# Patient Record
Sex: MALE | Race: WHITE | Employment: OTHER | URBAN - METROPOLITAN AREA
[De-identification: names, ages, dates, MRNs, and addresses within clinical notes are randomized per-mention and may not be internally consistent; named-entity substitution may affect disease eponyms.]

---
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
## 2022-01-07 ENCOUNTER — PREP FOR PROCEDURE (OUTPATIENT)
Dept: DENTISTRY | Age: 22
End: 2022-01-07

## 2022-01-07 RX ORDER — SODIUM CHLORIDE 9 MG/ML
25 INJECTION, SOLUTION INTRAVENOUS PRN
Status: CANCELLED | OUTPATIENT
Start: 2022-01-07

## 2022-01-07 RX ORDER — SODIUM CHLORIDE 0.9 % (FLUSH) 0.9 %
10 SYRINGE (ML) INJECTION EVERY 12 HOURS SCHEDULED
Status: CANCELLED | OUTPATIENT
Start: 2022-01-07

## 2022-01-07 RX ORDER — SODIUM CHLORIDE 0.9 % (FLUSH) 0.9 %
10 SYRINGE (ML) INJECTION PRN
Status: CANCELLED | OUTPATIENT
Start: 2022-01-07

## 2022-01-07 RX ORDER — SODIUM CHLORIDE, SODIUM LACTATE, POTASSIUM CHLORIDE, CALCIUM CHLORIDE 600; 310; 30; 20 MG/100ML; MG/100ML; MG/100ML; MG/100ML
INJECTION, SOLUTION INTRAVENOUS CONTINUOUS
Status: CANCELLED | OUTPATIENT
Start: 2022-01-07

## 2022-02-10 NOTE — PROGRESS NOTES
DR. 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 HIS LAST CHECKUP WAS IN 2019. EKG AND ECHO WERE NORMAL.  RECORDS ARE AVAILABLE IN CARE EVERYWHERE

## 2022-02-10 NOTE — PROGRESS NOTES
[facility] PRE-ADMISSION TESTING GENERAL INSTRUCTIONS- [facility]-phone number:[phone]    GENERAL INSTRUCTIONS  [x] Antibacterial Soap shower Night before and/or AM of Surgery  [] Tray wipe instruction sheet and wipes given. [x] Nothing by mouth after midnight, including gum, candy, mints, or water.  [] You may brush your teeth, gargle, but do NOT swallow water. []Hibiclens shower  the night before and the morning of surgery. Do not use             Hibiclens on your face or head. [x]No smoking, chewing tobacco, illegal drugs, or alcohol within 24 hours of your surgery. [x] Jewelry, valuables or body piercing's should not be brought to the hospital. All body and/or tongue piercing's must be removed prior to arriving to hospital.  ALL hair pins must be removed. [x] Do not wear makeup, lotions, powders, deodorant. Nail polish as directed by the nurse. [x] Arrange transportation with a responsible adult  to and from the hospital. If you do not have a responsible adult  to transport you, you will need to make arrangements with a medical transportation company (i.e. [employer]. A Uber/taxi/bus is not appropriate unless you are accompanied by a responsible adult ). Arrange for someone to be with you for the remainder of the day and for 24 hours after your procedure due to having had anesthesia. Who will be your  for transportation?____TERESA______________   Who will be staying with you for 24 hrs after yourTERESA procedure?__________________  [x] Bring insurance card and photo ID.  [] Transfusion Bracelet: Please bring with you to hospital, day of surgery  [] Bring urine specimen day of surgery. Any small container is acceptable. [] Use inhalers the morning of surgery and bring with you to hospital.  [x] Bring copy of living will or healthcare power of  papers to be placed in your electronic record.   [] CPAP/BI-PAP: Please bring your machine if you are to spend the night in the hospital.     PARKING INSTRUCTIONS:   [x] Arrival Time:____0900_________  · [x] Parking lot '\"I\"  is located on [facility] (the corner of [facility] and [facility]). To enter, press the button and the gate will lift. A free token will be provided to exit the lot. One car per patient is allowed to park in this lot. All other cars are to park on [address] [place] [address] either in the parking garage or the handicap lot. [] To reach the [facility] lobby from [address] [place] [address], upon entering the hospital, take elevator B to the 3rd floor. EDUCATION INSTRUCTIONS:      [] Knee or hip replacement booklet & exercise pamphlets given. [] [address] placed in chart. [] Pre-admission Testing educational folder given  [] Incentive Spirometry,coughing & deep breathing exercises reviewed. []Medication information sheet(s)   []Fluoroscopy-Xray used in surgery reviewed with patient. Educational pamphlet placed in chart. [x]Pain: Post-op pain is normal and to be expected. You will be asked to rate your pain from 0-10(a zero is not acceptable-education is needed). Your post-op pain goal is:3[] Ask your nurse for your pain medication. [] Joint camp offered. [] Joint replacement booklets given. [] Other:___________________________    MEDICATION INSTRUCTIONS:   [x]Bring a complete list of your medications, please write the last time you took the medicine, give this list to the nurse.   [] Take the following medications the morning of surgery with 1-2 ounces of water:   [x] Stop herbal supplements and vitamins 5 days before your surgery. LD 2/10/22  [] DO NOT take any diabetic medicine the morning of surgery. Follow instructions for insulin the day before surgery. [] If you are diabetic and your blood sugar is low or you feel symptomatic, you may drink 1-2 ounces of apple juice or take a glucose tablet.   The morning of your procedure, you may call the pre-op area if you have concerns about your blood sugar [phone]. [] Use your inhalers the morning of surgery. Bring your emergency inhaler with you day of surgery. [] Follow physician instructions regarding any blood thinners you may be taking. WHAT TO EXPECT:  [x] The day of surgery you will be greeted and checked in by the [employer].  In addition, you will be registered in the [place] by a Patient Access Representative. Please bring your photo ID and insurance card. A nurse will greet you in accordance to the time you are needed in the pre-op area to prepare you for surgery. Please do not be discouraged if you are not greeted in the order you arrive as there are many variables that are involved in patient preparation. Your patience is greatly appreciated as you wait for your nurse. Please bring in items such as: books, magazines, newspapers, electronics, or any other items  to occupy your time in the waiting area. [x]  Delays may occur with surgery and staff will make a sincere effort to keep you informed of delays. If any delays occur with your procedure, we apologize ahead of time for your inconvenience as we recognize the value of your time.

## 2022-02-10 NOTE — PROGRESS NOTES
[facility] [phone]. LEFT MESSAGE THAT WE NEED GUARDIANSHIP PAPERS FAXED TO US. FOSTER MOM [facility] IS AN AUTHORIZED [address] [place] [address]. AUTHORIZATION FROM [employer]  REQUESTED STATING THAT FOSTER MOM IS ABLE TO SIGN PERMITS AM OF SURGERY. IF FOSTER MOM CANNOT SIGN WE NEED [employer] TO SIGN PERMITS

## 2022-02-14 NOTE — PROGRESS NOTES
Left message with guardian Yu Oden, re: need POA papers faxed to PAT for consents to be signed prior to surgery on 2/16.

## 2022-02-15 ENCOUNTER — ANESTHESIA EVENT (OUTPATIENT)
Dept: OPERATING ROOM | Age: 22
End: 2022-02-15
Payer: COMMERCIAL

## 2022-02-16 ENCOUNTER — HOSPITAL ENCOUNTER (OUTPATIENT)
Age: 22
Setting detail: OUTPATIENT SURGERY
Discharge: HOME | End: 2022-02-16
Attending: DENTIST | Admitting: DENTIST
Payer: COMMERCIAL

## 2022-02-16 ENCOUNTER — ANESTHESIA (OUTPATIENT)
Dept: OPERATING ROOM | Age: 22
End: 2022-02-16
Payer: COMMERCIAL

## 2022-02-16 VITALS — SYSTOLIC BLOOD PRESSURE: 110 MMHG | DIASTOLIC BLOOD PRESSURE: 67 MMHG | OXYGEN SATURATION: 99 %

## 2022-02-16 VITALS
DIASTOLIC BLOOD PRESSURE: 68 MMHG | HEART RATE: 80 BPM | BODY MASS INDEX: 22.66 KG/M2 | SYSTOLIC BLOOD PRESSURE: 116 MMHG | HEIGHT: 65 IN | OXYGEN SATURATION: 99 % | TEMPERATURE: 97.8 F | WEIGHT: 136 LBS | RESPIRATION RATE: 19 BRPM

## 2022-02-16 PROBLEM — K02.9 DENTAL CARIES: Status: ACTIVE | Noted: 2022-02-16

## 2022-02-16 PROBLEM — K05.10 GINGIVITIS: Status: ACTIVE | Noted: 2022-02-16

## 2022-02-16 PROCEDURE — 7100000000 HC PACU RECOVERY - FIRST 15 MIN: Performed by: DENTIST

## 2022-02-16 PROCEDURE — 7100000011 HC PHASE II RECOVERY - ADDTL 15 MIN: Performed by: DENTIST

## 2022-02-16 PROCEDURE — 6360000002 HC RX W HCPCS: Performed by: NURSE ANESTHETIST, CERTIFIED REGISTERED

## 2022-02-16 PROCEDURE — 3600000013 HC SURGERY LEVEL 3 ADDTL 15MIN: Performed by: DENTIST

## 2022-02-16 PROCEDURE — 2500000003 HC RX 250 WO HCPCS

## 2022-02-16 PROCEDURE — 3700000000 HC ANESTHESIA ATTENDED CARE: Performed by: DENTIST

## 2022-02-16 PROCEDURE — 6360000002 HC RX W HCPCS

## 2022-02-16 PROCEDURE — 7100000010 HC PHASE II RECOVERY - FIRST 15 MIN: Performed by: DENTIST

## 2022-02-16 PROCEDURE — 3700000001 HC ADD 15 MINUTES (ANESTHESIA): Performed by: DENTIST

## 2022-02-16 PROCEDURE — 3600000003 HC SURGERY LEVEL 3 BASE: Performed by: DENTIST

## 2022-02-16 PROCEDURE — 2500000003 HC RX 250 WO HCPCS: Performed by: DENTIST

## 2022-02-16 PROCEDURE — 6370000000 HC RX 637 (ALT 250 FOR IP): Performed by: DENTIST

## 2022-02-16 PROCEDURE — 2580000003 HC RX 258: Performed by: NURSE ANESTHETIST, CERTIFIED REGISTERED

## 2022-02-16 PROCEDURE — 2500000003 HC RX 250 WO HCPCS: Performed by: NURSE ANESTHETIST, CERTIFIED REGISTERED

## 2022-02-16 PROCEDURE — 2709999900 HC NON-CHARGEABLE SUPPLY: Performed by: DENTIST

## 2022-02-16 PROCEDURE — 7100000001 HC PACU RECOVERY - ADDTL 15 MIN: Performed by: DENTIST

## 2022-02-16 RX ORDER — SODIUM CHLORIDE 0.9 % (FLUSH) 0.9 %
5-40 SYRINGE (ML) INJECTION PRN
Status: DISCONTINUED | OUTPATIENT
Start: 2022-02-16 | End: 2022-02-16 | Stop reason: HOSPADM

## 2022-02-16 RX ORDER — DEXAMETHASONE SODIUM PHOSPHATE 10 MG/ML
INJECTION INTRAMUSCULAR; INTRAVENOUS PRN
Status: DISCONTINUED | OUTPATIENT
Start: 2022-02-16 | End: 2022-02-16 | Stop reason: SDUPTHER

## 2022-02-16 RX ORDER — ROCURONIUM BROMIDE 10 MG/ML
INJECTION, SOLUTION INTRAVENOUS PRN
Status: DISCONTINUED | OUTPATIENT
Start: 2022-02-16 | End: 2022-02-16 | Stop reason: SDUPTHER

## 2022-02-16 RX ORDER — SODIUM CHLORIDE, SODIUM LACTATE, POTASSIUM CHLORIDE, CALCIUM CHLORIDE 600; 310; 30; 20 MG/100ML; MG/100ML; MG/100ML; MG/100ML
INJECTION, SOLUTION INTRAVENOUS CONTINUOUS
Status: DISCONTINUED | OUTPATIENT
Start: 2022-02-16 | End: 2022-02-16 | Stop reason: HOSPADM

## 2022-02-16 RX ORDER — IBUPROFEN 600 MG/1
600 TABLET ORAL EVERY 6 HOURS PRN
Qty: 20 TABLET | Refills: 0 | Status: SHIPPED | OUTPATIENT
Start: 2022-02-16 | End: 2022-02-16 | Stop reason: HOSPADM

## 2022-02-16 RX ORDER — PROPOFOL 10 MG/ML
INJECTION, EMULSION INTRAVENOUS PRN
Status: DISCONTINUED | OUTPATIENT
Start: 2022-02-16 | End: 2022-02-16 | Stop reason: SDUPTHER

## 2022-02-16 RX ORDER — ONDANSETRON 2 MG/ML
4 INJECTION INTRAMUSCULAR; INTRAVENOUS
Status: DISCONTINUED | OUTPATIENT
Start: 2022-02-16 | End: 2022-02-16 | Stop reason: HOSPADM

## 2022-02-16 RX ORDER — LIDOCAINE HYDROCHLORIDE 20 MG/ML
INJECTION, SOLUTION INTRAVENOUS PRN
Status: DISCONTINUED | OUTPATIENT
Start: 2022-02-16 | End: 2022-02-16 | Stop reason: SDUPTHER

## 2022-02-16 RX ORDER — SODIUM CHLORIDE 0.9 % (FLUSH) 0.9 %
10 SYRINGE (ML) INJECTION PRN
Status: DISCONTINUED | OUTPATIENT
Start: 2022-02-16 | End: 2022-02-16 | Stop reason: HOSPADM

## 2022-02-16 RX ORDER — FENTANYL CITRATE 50 UG/ML
25 INJECTION, SOLUTION INTRAMUSCULAR; INTRAVENOUS EVERY 5 MIN PRN
Status: DISCONTINUED | OUTPATIENT
Start: 2022-02-16 | End: 2022-02-16 | Stop reason: HOSPADM

## 2022-02-16 RX ORDER — SODIUM CHLORIDE 9 MG/ML
25 INJECTION, SOLUTION INTRAVENOUS PRN
Status: DISCONTINUED | OUTPATIENT
Start: 2022-02-16 | End: 2022-02-16 | Stop reason: HOSPADM

## 2022-02-16 RX ORDER — LIDOCAINE HYDROCHLORIDE AND EPINEPHRINE BITARTRATE 20; .01 MG/ML; MG/ML
INJECTION, SOLUTION SUBCUTANEOUS PRN
Status: DISCONTINUED | OUTPATIENT
Start: 2022-02-16 | End: 2022-02-16 | Stop reason: ALTCHOICE

## 2022-02-16 RX ORDER — FENTANYL CITRATE 50 UG/ML
INJECTION, SOLUTION INTRAMUSCULAR; INTRAVENOUS PRN
Status: DISCONTINUED | OUTPATIENT
Start: 2022-02-16 | End: 2022-02-16 | Stop reason: SDUPTHER

## 2022-02-16 RX ORDER — SODIUM CHLORIDE 0.9 % (FLUSH) 0.9 %
10 SYRINGE (ML) INJECTION EVERY 12 HOURS SCHEDULED
Status: DISCONTINUED | OUTPATIENT
Start: 2022-02-16 | End: 2022-02-16 | Stop reason: HOSPADM

## 2022-02-16 RX ORDER — ONDANSETRON 2 MG/ML
INJECTION INTRAMUSCULAR; INTRAVENOUS PRN
Status: DISCONTINUED | OUTPATIENT
Start: 2022-02-16 | End: 2022-02-16 | Stop reason: SDUPTHER

## 2022-02-16 RX ORDER — SODIUM CHLORIDE 0.9 % (FLUSH) 0.9 %
5-40 SYRINGE (ML) INJECTION EVERY 12 HOURS SCHEDULED
Status: DISCONTINUED | OUTPATIENT
Start: 2022-02-16 | End: 2022-02-16 | Stop reason: HOSPADM

## 2022-02-16 RX ORDER — MIDAZOLAM HYDROCHLORIDE 1 MG/ML
INJECTION INTRAMUSCULAR; INTRAVENOUS PRN
Status: DISCONTINUED | OUTPATIENT
Start: 2022-02-16 | End: 2022-02-16 | Stop reason: SDUPTHER

## 2022-02-16 RX ORDER — SODIUM CHLORIDE 9 MG/ML
INJECTION, SOLUTION INTRAVENOUS CONTINUOUS PRN
Status: DISCONTINUED | OUTPATIENT
Start: 2022-02-16 | End: 2022-02-16 | Stop reason: SDUPTHER

## 2022-02-16 RX ADMIN — FENTANYL CITRATE 50 MCG: 50 INJECTION, SOLUTION INTRAMUSCULAR; INTRAVENOUS at 11:02

## 2022-02-16 RX ADMIN — SODIUM CHLORIDE: 9 INJECTION, SOLUTION INTRAVENOUS at 10:56

## 2022-02-16 RX ADMIN — MIDAZOLAM 1 MG: 1 INJECTION INTRAMUSCULAR; INTRAVENOUS at 10:56

## 2022-02-16 RX ADMIN — LIDOCAINE HYDROCHLORIDE 40 MG: 20 INJECTION, SOLUTION INTRAVENOUS at 11:02

## 2022-02-16 RX ADMIN — MIDAZOLAM 1 MG: 1 INJECTION INTRAMUSCULAR; INTRAVENOUS at 10:50

## 2022-02-16 RX ADMIN — DEXAMETHASONE SODIUM PHOSPHATE 10 MG: 10 INJECTION INTRAMUSCULAR; INTRAVENOUS at 11:22

## 2022-02-16 RX ADMIN — PROPOFOL 120 MG: 10 INJECTION, EMULSION INTRAVENOUS at 11:02

## 2022-02-16 RX ADMIN — ROCURONIUM BROMIDE 40 MG: 10 SOLUTION INTRAVENOUS at 11:02

## 2022-02-16 RX ADMIN — SUGAMMADEX 150 MG: 100 INJECTION, SOLUTION INTRAVENOUS at 12:24

## 2022-02-16 RX ADMIN — ONDANSETRON 4 MG: 2 INJECTION INTRAMUSCULAR; INTRAVENOUS at 12:19

## 2022-02-16 ASSESSMENT — PULMONARY FUNCTION TESTS
PIF_VALUE: 21
PIF_VALUE: 17
PIF_VALUE: 0
PIF_VALUE: 16
PIF_VALUE: 17
PIF_VALUE: 16
PIF_VALUE: 16
PIF_VALUE: 17
PIF_VALUE: 17
PIF_VALUE: 16
PIF_VALUE: 17
PIF_VALUE: 16
PIF_VALUE: 17
PIF_VALUE: 16
PIF_VALUE: 16
PIF_VALUE: 17
PIF_VALUE: 16
PIF_VALUE: 17
PIF_VALUE: 16
PIF_VALUE: 17
PIF_VALUE: 16
PIF_VALUE: 16
PIF_VALUE: 21
PIF_VALUE: 0
PIF_VALUE: 17
PIF_VALUE: 14
PIF_VALUE: 16
PIF_VALUE: 17
PIF_VALUE: 16
PIF_VALUE: 17
PIF_VALUE: 16
PIF_VALUE: 16
PIF_VALUE: 17
PIF_VALUE: 20
PIF_VALUE: 17
PIF_VALUE: 17
PIF_VALUE: 0
PIF_VALUE: 1
PIF_VALUE: 17
PIF_VALUE: 17
PIF_VALUE: 16
PIF_VALUE: 17
PIF_VALUE: 16
PIF_VALUE: 17
PIF_VALUE: 16
PIF_VALUE: 16
PIF_VALUE: 0
PIF_VALUE: 17
PIF_VALUE: 16
PIF_VALUE: 17
PIF_VALUE: 1
PIF_VALUE: 8
PIF_VALUE: 14
PIF_VALUE: 17
PIF_VALUE: 6
PIF_VALUE: 15
PIF_VALUE: 17
PIF_VALUE: 16
PIF_VALUE: 17
PIF_VALUE: 17
PIF_VALUE: 16
PIF_VALUE: 17
PIF_VALUE: 16
PIF_VALUE: 17
PIF_VALUE: 17
PIF_VALUE: 0
PIF_VALUE: 17
PIF_VALUE: 18
PIF_VALUE: 17
PIF_VALUE: 16
PIF_VALUE: 17
PIF_VALUE: 16
PIF_VALUE: 18
PIF_VALUE: 16

## 2022-02-16 ASSESSMENT — PAIN SCALES - GENERAL
PAINLEVEL_OUTOF10: 0

## 2022-02-16 ASSESSMENT — PAIN - FUNCTIONAL ASSESSMENT: PAIN_FUNCTIONAL_ASSESSMENT: 0-10

## 2022-02-16 NOTE — PROGRESS NOTES
Pt brought to PACU from OR. Bed in lowest position and locked, side rails up, and call light given to pt. Caretaker called to bedside.

## 2022-02-16 NOTE — BRIEF OP NOTE
Brief Postoperative Note      Patient: Cara Kulkarni  YOB: 2000  MRN: [id number]    Date of Procedure: 2/16/2022    Pre-Op Diagnosis: DENTAL CARIES    Post-Op Diagnosis: dental caries, plaque induced gingivitis        Procedure(s):  DENTAL RESTORATIONS - FACILITY WITH ONE EXTRACTION    Surgeon(s):  CLAIR Daniels DDS    Assistant:  SARAH Morrison    Anesthesia: General ETT    Estimated Blood Loss (mL): Minimal    Complications: None    Specimens:   * No specimens in log *    Implants:  * No implants in log *      Drains: * No LDAs found *    Findings: Generalized inflammation and dental caries. Retained primary tooth #c lingual to #6.  Partially impacted #8,14,35,54    Electronically signed by Karissa Hewitt DDS on 2/16/2022 at 12:22 PM

## 2022-02-16 NOTE — INTERVAL H&P NOTE
Update History & Physical    The patient's History and Physical of January 29, 2022 was reviewed with the patient and I examined the patient. There was no change. The surgical site was confirmed by the patient and me. Plan: The risks, benefits, expected outcome, and alternative to the recommended procedure have been discussed with the patient. Patient and foster dad understands and wants to proceed with the procedure. Electronically signed by Jorge Chandra DDS on 2/16/2022 at 10:25 AM    I agree with the above.    Electronically signed by Geovany Franco DDS on 2/16/2022 at 1:03 PM

## 2022-02-16 NOTE — ANESTHESIA PRE PROCEDURE
Department of Anesthesiology  Preprocedure Note       Name:  Murtaza Nicole   Age:  22 y.o.  :  2000                                          MRN:  [id number]         Date:  2022      Surgeon: Alysha Solomon):  Beba Antunez DDS    Procedure: Procedure(s):  DENTAL RESTORATIONS - FACILITY    Medications prior to admission:   Prior to Admission medications    Medication Sig Start Date End Date Taking? Authorizing Provider   melatonin 3 MG TABS tablet Take 3 mg by mouth nightly   Yes Historical Provider, MD   Nutritional Supplements (BOOST) LIQD Take 2 Cans by mouth daily 20  Yes Historical Provider, MD   Pediatric Multivit-Minerals-C (MULTIVITAMIN GUMMIES CHILDRENS PO) Take by mouth daily   Yes Historical Provider, MD       Current medications:    Current Facility-Administered Medications   Medication Dose Route Frequency Provider Last Rate Last Admin    0.9 % sodium chloride infusion  25 mL IntraVENous PRN Beba Antunez, DDS        lactated ringers infusion   IntraVENous Continuous Beba Antunez DDS        sodium chloride flush 0.9 % injection 10 mL  10 mL IntraVENous 2 times per day Bebadylon Antunez, DDS        sodium chloride flush 0.9 % injection 10 mL  10 mL IntraVENous PRN Beba Tulio, DDS           Allergies:     Allergies   Allergen Reactions    Morphine And Related Shortness Of Breath    Adderall [Amphetamine-Dextroamphet Er] Other (See Comments)     Causes constipation    Prednisone Other (See Comments)     Violent mood swings    Red Dye Other (See Comments)     Violent mood swings       Problem List:    Patient Active Problem List   Diagnosis Code    Bronchitis J40    Acute upper respiratory infection J06.9    Acute maxillary sinusitis J01.00       Past Medical History:        Diagnosis Date    ADD (attention deficit disorder with hyperactivity)     At risk for complication of anesthesia     Autism     Cerebral palsy (HCC)     Classic myotonic dystrophy type 1 ([facility] [address]     Cognitive dysfunction     Drooling     Dystrophy, myotonica (HCC)     FOLLOWS WITH CARDIOLOGY [facility] SEE NOTES CARE EVERYWHERE    Environmental allergies     Hemiatrophy of left leg     Ineffective airway clearance     Left leg weakness     Mood swings        Past Surgical History:        Procedure Laterality Date    ABDOMEN SURGERY      Cigmoidectomy     ADENOIDECTOMY      COLONOSCOPY      MYRINGOTOMY AND TYMPANOSTOMY TUBE PLACEMENT      SMALL INTESTINE SURGERY         Social History:    Social History     Tobacco Use    Smoking status: Never Smoker    Smokeless tobacco: Never Used   Substance Use Topics    Alcohol use: No                                Counseling given: Not Answered      Vital Signs (Current):   Vitals:    02/10/22 1118 02/10/22 1154 02/16/22 0958   BP:   129/79   Pulse:   114   Resp:   16   Temp:   37.3 °C (99.1 °F)   TempSrc:   Temporal   SpO2:   95%   Weight: 136 lb (61.7 kg) 136 lb (61.7 kg) 136 lb (61.7 kg)   Height: 5' 5.75\" (1.67 m) 5' 5.75\" (1.67 m) 5' 5\" (1.651 m)                                              BP Readings from Last 3 Encounters:   02/16/22 129/79   07/14/20 120/69   01/09/19 114/69 (54 %, Z = 0.10 /  69 %, Z = 0.50)*     *BP percentiles are based on the 2017 AAP Clinical Practice Guideline for boys       NPO Status: Time of last liquid consumption: 2000                        Time of last solid consumption: 2000                        Date of last liquid consumption: 02/15/22                        Date of last solid food consumption: 02/15/22    BMI:   Wt Readings from Last 3 Encounters:   02/16/22 136 lb (61.7 kg)   07/14/20 122 lb (55.3 kg) (7 %, Z= -1.50)*   06/30/20 122 lb (55.3 kg) (7 %, Z= -1.49)*     * Growth percentiles are based on CDC (Boys, 2-20 Years) data. Body mass index is 22.63 kg/m².     CBC:   Lab Results   Component Value Date    WBC 12.4 11/13/2018    RBC 5.38 11/13/2018    HGB 15.7 11/13/2018    HCT 46.5 11/13/2018    MCV 86.4 11/13/2018    RDW 13.2 11/13/2018     11/13/2018       CMP:   Lab Results   Component Value Date     11/13/2018    K 3.4 11/13/2018     11/13/2018    CO2 26 11/13/2018    BUN 14 11/13/2018    CREATININE 0.6 11/13/2018    GFRAA >60 11/13/2018    LABGLOM >60 11/13/2018    GLUCOSE 83 11/13/2018    PROT 7.3 11/13/2018    CALCIUM 9.8 11/13/2018    BILITOT 0.4 11/13/2018    ALKPHOS 106 11/13/2018    AST 24 11/13/2018    ALT 26 11/13/2018       POC Tests: No results for input(s): POCGLU, POCNA, POCK, POCCL, POCBUN, POCHEMO, POCHCT in the last 72 hours. Coags: No results found for: PROTIME, INR, APTT    HCG (If Applicable): No results found for: PREGTESTUR, PREGSERUM, HCG, HCGQUANT     ABGs: No results found for: PHART, PO2ART, RRQ4ATU, JYV8HVX, BEART, V8WVLERT     Type & Screen (If Applicable):  No results found for: LABABO, LABRH    Drug/Infectious Status (If Applicable):  No results found for: HIV, HEPCAB    COVID-19 Screening (If Applicable): No results found for: COVID19        Anesthesia Evaluation  Patient summary reviewed and Nursing notes reviewed no history of anesthetic complications:   Airway: Mallampati: III  TM distance: >3 FB   Neck ROM: full  Mouth opening: > = 3 FB Dental:      Comment: Poor dental health     Pulmonary: breath sounds clear to auscultation            Patient did not smoke on day of surgery. ROS comment: Bronchitis  Acute upper respiratory infection  Acute maxillary sinusitis       Cardiovascular:  Exercise tolerance: good (>4 METS),         ECG reviewed  Rhythm: regular  Rate: normal  Echocardiogram reviewed         Beta Blocker:  Not on Beta Blocker         Neuro/Psych:   (+) neuromuscular disease:, psychiatric history:             ROS comment: Autism  Cerebral palsy   Mood swings  Dystrophy myotonica   Cognitive dysfunction GI/Hepatic/Renal:   (+) GERD:,          ROS comment: drooling.    Endo/Other: Negative Endo/Other ROS             Pt had no PAT visit       Abdominal:             Vascular: negative vascular ROS. Other Findings:           Anesthesia Plan      general     ASA 3       Induction: intravenous. MIPS: Postoperative opioids intended, Prophylactic antiemetics administered and Postoperative trial extubation. Anesthetic plan and risks discussed with patient. Use of blood products discussed with patient whom consented to blood products. Plan discussed with CRNA and attending.                   Rosie Jaimes RN   2/16/2022

## 2022-02-16 NOTE — OP NOTE
Date of Procedure: 2/16/2022     Surgeon: CLAIR Gale DDS      Surgical Wound Classification: Clean Contaminated II    Preoperative Diagnosis: dental caries      Postoperative Diagnosis: dental caries, gingivitis     Operation: Exam, Prophy, Fluoride Treatment, Restorative: x5 Extractions: x1    Anesthesia: General ETT    Estimated Blood Loss: minimal    Complications:  None    Fluids: 1000 mL    Specimen: N/A    Conditions:  good    Disposition: To PACU, stable    Procedure: This patient was initially seen in the preoperative holding area, where the history, physical and consents were updated and verified. The patient was then transferred via the anesthesia team and [facility] to operating room # 10 at [address] [place] [address] on 2/16/2022 , at which time the patient was transferred from the [facility] onto the operating room table and placed in the supine position. The patient's arms and legs were padded at the sides. The patient had the general anesthetic monitors applied. Please see anesthesia notes for complete details. Once the patient was placed into a general anesthetic state, the surgical area was prepped and draped in a standard oral and maxillofacial surgery fashion. A throat pack was placed. A comprehensive oral exam was performed. 16 radiographs were taken. A treatment plan was formulated based upon presenting clinical and radiographic findings. Caries were identified, followed by the preparation of the following teeth: #14,18,19,30,31. Dental restorations were placed as follows: 1. Composite: #14-L  2. Amalgam: #18-O, #19-DO, #30-O, #31-O.   3. SDF- #17,18,19,30,31. Dental restorations were polished and refined to proper occlusion. A prophylaxis with pumice was performed. Peridex and fluoride applied. #1,45,33,53 impacted. #1,16 not visible clinically. #17,32 partially erupted. No operculum or signs of infection in these areas.  Discussed with family referral to [facility] for evaluation and treatment of thirds in neccessary. Surgical procedure was initiated. Using 1.5 length 27-gauge needle, and approx 1 mL of 2% lidocaine with 1:100,000 epinephrine was administered. Utilizing proper surgical instruments and techniques, the following teeth were removed:  Retained primary tooth #C. #6 erupted facial to #C and has class lll mobility. Teeth were removed due to caries supported by clinical and radiographic evidence. All teeth removed were non-restorable. Extraction sites were copiously irrigated with normal saline, and felt to be smooth by finger touch. Hemostasis achieved. One final round of copious irrigation with suction was completed. Throat pack was removed. Patient was awake from anesthesia. Patient was released to recovery room in good condition. Patient tolerated procedure well. Postoperative instructions given to parent. Wyatt Galvan does not take tablets, only liquids. Patient's pharmacy, [facility], does not carry any ibuprofen without red dye, as patient has an allergy. Patient's step father has dye-free liquid OTC ibuprofen at home. Recommend for Wyatt Galvan to take per instructions as needed prn pain. 1-Week Post Op:  2/23/2022 at  11 AM    Written by: Catie Beltran DDS , D.D.S.    Electronically signed by Catie Beltran DDS on 2/16/2022 at 3:39 PM

## 2022-02-16 NOTE — H&P
Dental History and Physical    Lela Leon    [address] [facility] [place] [address]    The patient is a 22 y.o. male     Chief Complaint: Dental Exam    History of present illness: Due to patient's inability to cooperate for necessary diagnostic and restorative procedures in the clinical setting, recommend for comprehensive dental care to be completed under GA. Past Medical History:      Diagnosis Date    ADD (attention deficit disorder with hyperactivity)     At risk for complication of anesthesia     Autism     Cerebral palsy ([facility] [address]     Classic myotonic dystrophy type 1 (HCC)     Cognitive dysfunction     Drooling     Dystrophy, myotonica ([facility] [address]     FOLLOWS WITH CARDIOLOGY [place] [facility] SEE NOTES CARE EVERYWHERE    Environmental allergies     Hemiatrophy of left leg     Ineffective airway clearance     Left leg weakness     Mood swings        Past Surgical History:        Procedure Laterality Date    ABDOMEN SURGERY      Cigmoidectomy     ADENOIDECTOMY      COLONOSCOPY      MYRINGOTOMY AND TYMPANOSTOMY TUBE PLACEMENT      SMALL INTESTINE SURGERY         Medications Prior to Admission:    Prior to Admission medications    Medication Sig Start Date End Date Taking? Authorizing Provider   melatonin 3 MG TABS tablet Take 3 mg by mouth nightly   Yes Historical Provider, MD   Nutritional Supplements (BOOST) LIQD Take 2 Cans by mouth daily 2/6/20  Yes Historical Provider, MD   Pediatric Multivit-Minerals-C (MULTIVITAMIN GUMMIES CHILDRENS PO) Take by mouth daily   Yes Historical Provider, MD       Allergies:  Morphine and related, Adderall [amphetamine-dextroamphet er], Prednisone, and Red dye    Social History:   TOBACCO:   reports that he has never smoked. He has never used smokeless tobacco.  ETOH:   reports no history of alcohol use. OCCUPATION:  NA    Family History:   History reviewed. No pertinent family history.     REVIEW OF SYSTEMS:  Completed by Sangita Lea on 1/31/22    Labs and Imaging Studies   Basic Labs  CBC with Differential:    Lab Results   Component Value Date    WBC 12.4 11/13/2018    RBC 5.38 11/13/2018    HGB 15.7 11/13/2018    HCT 46.5 11/13/2018     11/13/2018    MCV 86.4 11/13/2018    MCH 29.2 11/13/2018    MCHC 33.8 11/13/2018    RDW 13.2 11/13/2018    LYMPHOPCT 20.9 11/13/2018    MONOPCT 4.7 11/13/2018    BASOPCT 0.2 11/13/2018    MONOSABS 0.58 11/13/2018    LYMPHSABS 2.59 11/13/2018    EOSABS 0.04 11/13/2018    BASOSABS 0.03 11/13/2018       Imaging Studies:  Radiology:   TBD in OR    Oral Findings:    Hygiene: mucous membranes moist, pharynx normal without lesions and dental hygiene poor    Dentition: poor    Teeth: caries: TBD    Retained roots 0    Impactions tooth # 0     Gingiva: poor and swollen    Mucous Membrane: mucous membranes moist, pharynx normal without lesions and dental hygiene poor    Tongue: midline    Floor of mouth: normal    Alveolar Process: normal     Salivary Glands: normal    Tentative Diagnosis: Caries, Gingivitis     Resident Assessment and Plan:   Full mouth series of radiographs  Prophy/SRP  Any indicated restorations  Any indicated extractions     Osman Ward DDS  2/16/2022  6:59 AM    Attending physician: Dr. Shahriar Vizcarra     I agree with the above.    Electronically signed by Catie Beltran DDS on 2/16/2022 at 7:06 AM

## 2022-02-16 NOTE — ANESTHESIA POSTPROCEDURE EVALUATION
Department of Anesthesiology  Postprocedure Note    Patient: Tato Oh  MRN: [id number]  YOB: 2000  Date of evaluation: 2/16/2022  Time:  12:53 PM     Procedure Summary     Date: 02/16/22 Room / Location: [facility] OR 10 / [facility]    Anesthesia Start: 1050 Anesthesia Stop: 4267    Procedure: DENTAL RESTORATIONS - FACILITY WITH ONE EXTRACTION (N/A Mouth) Diagnosis: (DENTAL CARIES)    Surgeons: Fracisco Mike DDS Responsible Provider: Eun Rosales MD    Anesthesia Type: general ASA Status: 3          Anesthesia Type: general    Yuly Phase I: Yuly Score: 8    Yuly Phase II:      Last vitals: Reviewed and per EMR flowsheets.        Anesthesia Post Evaluation    Patient location during evaluation: PACU  Patient participation: complete - patient participated  Level of consciousness: awake  Pain score: 0  Airway patency: patent  Nausea & Vomiting: no nausea  Complications: no  Cardiovascular status: hemodynamically stable  Respiratory status: acceptable  Hydration status: stable

## 2024-02-28 ENCOUNTER — OFFICE VISIT (OUTPATIENT)
Dept: FAMILY MEDICINE CLINIC | Age: 24
End: 2024-02-28
Payer: COMMERCIAL

## 2024-02-28 VITALS
DIASTOLIC BLOOD PRESSURE: 71 MMHG | HEART RATE: 63 BPM | OXYGEN SATURATION: 99 % | WEIGHT: 126 LBS | BODY MASS INDEX: 20.99 KG/M2 | TEMPERATURE: 98.2 F | SYSTOLIC BLOOD PRESSURE: 112 MMHG | HEIGHT: 65 IN

## 2024-02-28 DIAGNOSIS — F84.0 AUTISM: Primary | ICD-10-CM

## 2024-02-28 DIAGNOSIS — G71.11 MYOTONIC DYSTROPHY (HCC): ICD-10-CM

## 2024-02-28 PROCEDURE — G8484 FLU IMMUNIZE NO ADMIN: HCPCS | Performed by: FAMILY MEDICINE

## 2024-02-28 PROCEDURE — 1036F TOBACCO NON-USER: CPT | Performed by: FAMILY MEDICINE

## 2024-02-28 PROCEDURE — G8427 DOCREV CUR MEDS BY ELIG CLIN: HCPCS | Performed by: FAMILY MEDICINE

## 2024-02-28 PROCEDURE — G8420 CALC BMI NORM PARAMETERS: HCPCS | Performed by: FAMILY MEDICINE

## 2024-02-28 PROCEDURE — 99203 OFFICE O/P NEW LOW 30 MIN: CPT | Performed by: FAMILY MEDICINE

## 2024-02-28 NOTE — PROGRESS NOTES
3/11/2024    Issac Nelson    Chief Complaint   Patient presents with    New Patient     New patient [facility]        HPI  History was obtained from foster father  Issac is a 23 y.o. male who presents today with the followin. Autism    2. Myotonic dystrophy (HCC)    Patient presents to establish primary care.  Patient is here with his father.  Patient is autistic but he is able to go to work daily at [employer].  Patient can read at third grade level.  He can write his name and some words.  He does have some friends.  He does like to go for walks but tires easily.     REVIEW OF SYMPTOMS    Review of Systems   Constitutional:  Negative for chills, fatigue and fever.   HENT:  Negative for congestion, mouth sores, postnasal drip, rhinorrhea and sinus pain.    Eyes:  Negative for photophobia, discharge and redness.   Respiratory:  Negative for cough and shortness of breath.    Cardiovascular:  Negative for chest pain.   Gastrointestinal: Negative.    Genitourinary:  Negative for difficulty urinating, dysuria, hematuria and urgency.   Neurological:  Negative for headaches.   Psychiatric/Behavioral:  Negative for sleep disturbance.        PAST MEDICAL HISTORY  Past Medical History:   Diagnosis Date    ADD (attention deficit disorder with hyperactivity)     At risk for complication of anesthesia     Autism     Cerebral palsy (HCC)     Classic myotonic dystrophy type 1 (HCC)     Cognitive dysfunction     Drooling     Dystrophy, myotonica (HCC)     [facility]    Environmental allergies     Hemiatrophy of left leg     Ineffective airway clearance     Left leg weakness     Mood swings        FAMILY HISTORY  History reviewed. No pertinent family history.    SOCIAL HISTORY  Social History     Socioeconomic History    Marital status: Single     Spouse name: None    Number of children: None    Years of education: None    Highest education level: None   Tobacco Use    Smoking

## 2024-03-11 ASSESSMENT — ENCOUNTER SYMPTOMS
COUGH: 0
SHORTNESS OF BREATH: 0
PHOTOPHOBIA: 0
EYE REDNESS: 0
SINUS PAIN: 0
EYE DISCHARGE: 0
RHINORRHEA: 0
GASTROINTESTINAL NEGATIVE: 1

## 2024-05-06 RX ORDER — ACETAMINOPHEN 160 MG/5ML
500 SUSPENSION ORAL EVERY 6 HOURS PRN
Qty: 240 ML | Refills: 1 | Status: SHIPPED | OUTPATIENT
Start: 2024-05-06

## 2024-05-06 RX ORDER — ACETAMINOPHEN 160 MG/5ML
500 SUSPENSION ORAL EVERY 6 HOURS PRN
COMMUNITY
Start: 2022-04-05 | End: 2024-05-06 | Stop reason: SDUPTHER

## 2024-05-06 NOTE — TELEPHONE ENCOUNTER
Acetaminophen 5ml by mouth every 6 hours for pain.    Ibuprofen - 400 mg by mouth every 6 hours as needed for a fever greater than 100.4    Please send to [facility]

## 2024-05-29 RX ORDER — LANOLIN ALCOHOL/MO/W.PET/CERES
3 CREAM (GRAM) TOPICAL NIGHTLY
Qty: 30 TABLET | Refills: 3 | Status: SHIPPED | OUTPATIENT
Start: 2024-05-29

## 2024-06-10 ENCOUNTER — TELEPHONE (OUTPATIENT)
Dept: FAMILY MEDICINE CLINIC | Age: 24
End: 2024-06-10

## 2024-06-10 NOTE — TELEPHONE ENCOUNTER
The following meds need to be worded specific for the following meds, this is required for [facility] board of disabilities    Ib profen needs too state for pain (take off fever)    Tylenol needs to state for fever over 100.4 on script    These go to [facility]    Also pt needs a script for a cough assist machine, has machine but needs script for this, PRN, send to [facility] as well

## 2024-06-12 RX ORDER — ACETAMINOPHEN 160 MG/5ML
500 SUSPENSION ORAL EVERY 6 HOURS PRN
Qty: 240 ML | Refills: 4 | Status: SHIPPED | OUTPATIENT
Start: 2024-06-12

## 2024-09-19 ENCOUNTER — OFFICE VISIT (OUTPATIENT)
Dept: PRIMARY CARE CLINIC | Age: 24
End: 2024-09-19

## 2024-09-19 VITALS
DIASTOLIC BLOOD PRESSURE: 73 MMHG | HEIGHT: 65 IN | WEIGHT: 125 LBS | TEMPERATURE: 97.7 F | HEART RATE: 101 BPM | OXYGEN SATURATION: 96 % | SYSTOLIC BLOOD PRESSURE: 111 MMHG | BODY MASS INDEX: 20.83 KG/M2

## 2024-09-19 DIAGNOSIS — J18.9 COMMUNITY ACQUIRED PNEUMONIA OF RIGHT MIDDLE LOBE OF LUNG: ICD-10-CM

## 2024-09-19 DIAGNOSIS — R05.9 COUGH IN ADULT PATIENT: ICD-10-CM

## 2024-09-19 LAB
INFLUENZA A ANTIGEN, POC: NEGATIVE
INFLUENZA B ANTIGEN, POC: NEGATIVE
LOT EXPIRE DATE: NORMAL
LOT KIT NUMBER: NORMAL
S PYO AG THROAT QL: NORMAL
SARS-COV-2, POC: NORMAL
VALID INTERNAL CONTROL: NORMAL
VENDOR AND KIT NAME POC: NORMAL

## 2024-09-19 RX ORDER — DOXYCYCLINE 100 MG/1
100 CAPSULE ORAL 2 TIMES DAILY
Qty: 20 CAPSULE | Refills: 0 | Status: SHIPPED | OUTPATIENT
Start: 2024-09-19 | End: 2024-09-29

## 2024-09-19 RX ORDER — IPRATROPIUM BROMIDE AND ALBUTEROL SULFATE 2.5; .5 MG/3ML; MG/3ML
1 SOLUTION RESPIRATORY (INHALATION) EVERY 6 HOURS PRN
Qty: 120 ML | Refills: 0 | Status: SHIPPED | OUTPATIENT
Start: 2024-09-19

## 2024-09-19 RX ORDER — IPRATROPIUM BROMIDE AND ALBUTEROL SULFATE 2.5; .5 MG/3ML; MG/3ML
1 SOLUTION RESPIRATORY (INHALATION) ONCE
Status: COMPLETED | OUTPATIENT
Start: 2024-09-19 | End: 2024-09-19

## 2024-09-19 RX ORDER — CEFTRIAXONE 500 MG/1
500 INJECTION, POWDER, FOR SOLUTION INTRAMUSCULAR; INTRAVENOUS ONCE
Status: COMPLETED | OUTPATIENT
Start: 2024-09-19 | End: 2024-09-19

## 2024-09-19 RX ORDER — GUAIFENESIN 600 MG/1
600 TABLET, EXTENDED RELEASE ORAL 2 TIMES DAILY
Qty: 30 TABLET | Refills: 0 | Status: SHIPPED | OUTPATIENT
Start: 2024-09-19 | End: 2024-10-04

## 2024-09-19 RX ADMIN — IPRATROPIUM BROMIDE AND ALBUTEROL SULFATE 1 DOSE: 2.5; .5 SOLUTION RESPIRATORY (INHALATION) at 11:45

## 2024-09-19 RX ADMIN — CEFTRIAXONE 500 MG: 500 INJECTION, POWDER, FOR SOLUTION INTRAMUSCULAR; INTRAVENOUS at 11:27

## 2024-09-30 ENCOUNTER — TELEPHONE (OUTPATIENT)
Dept: FAMILY MEDICINE CLINIC | Age: 24
End: 2024-09-30

## 2024-09-30 RX ORDER — LANOLIN ALCOHOL/MO/W.PET/CERES
1 CREAM (GRAM) TOPICAL NIGHTLY
Qty: 31 TABLET | Refills: 3 | Status: SHIPPED | OUTPATIENT
Start: 2024-09-30

## 2024-11-04 RX ORDER — ACETAMINOPHEN 160 MG/5ML
SUSPENSION ORAL
Qty: 240 ML | Refills: 4 | OUTPATIENT
Start: 2024-11-04

## 2024-11-04 RX ORDER — IBUPROFEN 100 MG/5ML
SUSPENSION ORAL
Qty: 240 ML | Refills: 4 | OUTPATIENT
Start: 2024-11-04

## 2025-01-03 NOTE — TELEPHONE ENCOUNTER
Name of Medication(s) Requested:  Requested Prescriptions     Pending Prescriptions Disp Refills    Acetaminophen Childrens 160 MG/5ML SOLN [Pharmacy Med Name: Children's Acetaminophen 160 mg/5 mL oral liquid] 240 mL 1     Sig: TAKE 15.62ml BY MOUTH EVERY 6 HOURS AS NEEDED FOR FEVER       Medication is on current medication list Yes    Dosage and directions were verified? Yes    Quantity verified: 30 day supply     Pharmacy Verified?  Yes    Last Appointment:  2/28/2024    Future appts:  No future appointments.     (If no appt send self scheduling link. .REFILLAPPT)  Scheduling request sent?     [] Yes  [] No    Does patient need updated?  [] Yes  [] No

## 2025-01-06 RX ORDER — ACETAMINOPHEN 160 MG/5ML
LIQUID ORAL
Qty: 240 ML | Refills: 1 | Status: SHIPPED | OUTPATIENT
Start: 2025-01-06

## 2025-01-31 NOTE — TELEPHONE ENCOUNTER
Name of Medication(s) Requested:  Requested Prescriptions     Pending Prescriptions Disp Refills    melatonin 3 MG TABS tablet [Pharmacy Med Name: melatonin 3 mg tablet] 31 tablet 3     Sig: TAKE ONE TABLET BY MOUTH AT BEDTIME       Medication is on current medication list Yes    Dosage and directions were verified? Yes    Quantity verified: 30 day supply     Pharmacy Verified?  Yes    Last Appointment:  2/28/2024    Future appts:  No future appointments.     (If no appt send self scheduling link. .REFILLAPPT)  Scheduling request sent?     [] Yes  [] No    Does patient need updated?  [] Yes  [] No

## 2025-02-11 ENCOUNTER — TELEPHONE (OUTPATIENT)
Dept: FAMILY MEDICINE CLINIC | Age: 25
End: 2025-02-11

## 2025-02-11 NOTE — TELEPHONE ENCOUNTER
Paperwork is done and scanned into chart. I spoke with daniele and she will come in and  the originals from [facility] tomorrow. I put them in 's bag and she will get them from him tomorrow.

## 2025-02-11 NOTE — TELEPHONE ENCOUNTER
Chioma Benjamin called today too check status on paperwork that was dropped off in [place] for the pt, call to let her know the status [phone]

## 2025-02-28 NOTE — TELEPHONE ENCOUNTER
Name of Medication(s) Requested:  Requested Prescriptions     Pending Prescriptions Disp Refills    Acetaminophen Childrens 160 MG/5ML SOLN [Pharmacy Med Name: Children's Acetaminophen 160 mg/5 mL oral liquid] 240 mL 1     Sig: TAKE 15.62ml BY MOUTH EVERY 6 HOURS AS NEEDED FOR FEVER       Medication is on current medication list Yes    Dosage and directions were verified? Yes    Quantity verified: 30 day supply     Pharmacy Verified?  Yes    Last Appointment:  2/28/2024    Future appts:  No future appointments.     (If no appt send self scheduling link. .REFILLAPPT)  Scheduling request sent?     [] Yes  [x] No    Does patient need updated?  [] Yes  [x] No

## 2025-03-03 RX ORDER — ACETAMINOPHEN 160 MG/5ML
LIQUID ORAL
Qty: 240 ML | Refills: 1 | Status: SHIPPED | OUTPATIENT
Start: 2025-03-03

## 2025-04-01 RX ORDER — ACETAMINOPHEN 160 MG/5ML
SUSPENSION ORAL
Qty: 240 ML | Refills: 4 | OUTPATIENT
Start: 2025-04-01

## 2025-04-01 RX ORDER — IBUPROFEN 100 MG/5ML
SUSPENSION ORAL
Qty: 240 ML | Refills: 4 | Status: SHIPPED | OUTPATIENT
Start: 2025-04-01

## 2025-04-01 NOTE — TELEPHONE ENCOUNTER
Name of Medication(s) Requested:  Requested Prescriptions     Pending Prescriptions Disp Refills    ibuprofen (ADVIL;MOTRIN) 100 MG/5ML suspension [Pharmacy Med Name: ibuprofen 100 mg/5 mL oral suspension] 240 mL 4     Sig: TAKE 20 ML BY MOUTH EVERY 8 HOURS AS NEEDED FOR PAIN     Refused Prescriptions Disp Refills    Acetaminophen Childrens 160 MG/5ML SUSP [Pharmacy Med Name: Children's Acetaminophen 160 mg/5 mL oral suspension] 240 mL 4     Sig: TAKE 15.62 ML BY MOUTH EVERY 6 HOURS AS NEEDED FOR FEVER greater THAN 100.4       Medication is on current medication list Yes    Dosage and directions were verified? Yes    Quantity verified: 30 day supply     Pharmacy Verified?  Yes    Last Appointment:  2/28/2024    Future appts:  No future appointments.     (If no appt send self scheduling link. .REFILLAPPT)  Scheduling request sent?     [] Yes  [x] No    Does patient need updated?  [] Yes  [x] No

## 2025-04-04 ENCOUNTER — TELEPHONE (OUTPATIENT)
Dept: FAMILY MEDICINE CLINIC | Age: 25
End: 2025-04-04

## 2025-04-04 NOTE — TELEPHONE ENCOUNTER
The following meds are being requested by [facility] for the pt:    Multi Adult Chewable Vit    Sod Choride 1 % gel    Chlorhexidine oral rinse     Thanks

## 2025-04-07 RX ORDER — CHLORHEXIDINE GLUCONATE ORAL RINSE 1.2 MG/ML
15 SOLUTION DENTAL 2 TIMES DAILY
Qty: 420 ML | Refills: 1 | Status: SHIPPED | OUTPATIENT
Start: 2025-04-07

## 2025-04-07 NOTE — TELEPHONE ENCOUNTER
I have renewed patient's chewable vitamin and chlorhexidine oral rinse.  What is the use the sodium chloride gel for?

## 2025-04-14 ENCOUNTER — OFFICE VISIT (OUTPATIENT)
Dept: FAMILY MEDICINE CLINIC | Age: 25
End: 2025-04-14

## 2025-04-14 VITALS
WEIGHT: 135 LBS | BODY MASS INDEX: 22.47 KG/M2 | DIASTOLIC BLOOD PRESSURE: 72 MMHG | TEMPERATURE: 98.9 F | HEART RATE: 80 BPM | SYSTOLIC BLOOD PRESSURE: 112 MMHG

## 2025-04-14 DIAGNOSIS — F84.0 AUTISM: Primary | ICD-10-CM

## 2025-04-14 DIAGNOSIS — G71.11 MYOTONIC DYSTROPHY (HCC): ICD-10-CM

## 2025-04-14 DIAGNOSIS — Z02.1 PHYSICAL EXAM, PRE-EMPLOYMENT: ICD-10-CM

## 2025-04-14 RX ORDER — SODIUM FLUORIDE 5 MG/G
GEL, DENTIFRICE DENTAL
COMMUNITY
Start: 2025-02-28

## 2025-04-14 RX ORDER — ACETAMINOPHEN 160 MG
TABLET,DISINTEGRATING ORAL
COMMUNITY
Start: 2025-04-07

## 2025-04-14 SDOH — ECONOMIC STABILITY: FOOD INSECURITY: WITHIN THE PAST 12 MONTHS, THE FOOD YOU BOUGHT JUST DIDN'T LAST AND YOU DIDN'T HAVE MONEY TO GET MORE.: NEVER TRUE

## 2025-04-14 SDOH — ECONOMIC STABILITY: FOOD INSECURITY: WITHIN THE PAST 12 MONTHS, YOU WORRIED THAT YOUR FOOD WOULD RUN OUT BEFORE YOU GOT MONEY TO BUY MORE.: NEVER TRUE

## 2025-04-14 ASSESSMENT — PATIENT HEALTH QUESTIONNAIRE - PHQ9: DEPRESSION UNABLE TO ASSESS: FUNCTIONAL CAPACITY MOTIVATION LIMITS ACCURACY

## 2025-04-14 NOTE — PROGRESS NOTES
2025    Issac Nelson    Chief Complaint   Patient presents with    Other     Pt needs a tb test for work.        HPI  History was obtained from patient and caregiver.  Issac is a 24 y.o. male who presents today with the followin. Autism    2. Myotonic dystrophy (HCC)    3. Physical exam, pre-employment       Patient presents today for follow-up of autism myotonic dystrophy and a preemployment physical.  Patient has a new job and is required to have a physical examination.  Patient has no changes since his last visit.  He is doing well on his current medication.    REVIEW OF SYMPTOMS    Review of Systems   Constitutional:  Negative for chills, fatigue and fever.   HENT:  Negative for congestion, mouth sores, postnasal drip, rhinorrhea and sinus pain.    Eyes:  Negative for photophobia, discharge and redness.   Respiratory:  Negative for cough and shortness of breath.    Cardiovascular:  Negative for chest pain.   Genitourinary:  Negative for difficulty urinating, dysuria, hematuria and urgency.   Neurological:  Negative for headaches.   Psychiatric/Behavioral:  Negative for sleep disturbance.        PAST MEDICAL HISTORY  Past Medical History:   Diagnosis Date    ADD (attention deficit disorder with hyperactivity)     At risk for complication of anesthesia     Autism     Cerebral palsy (HCC)     Classic myotonic dystrophy type 1 (HCC)     Cognitive dysfunction     Drooling     Dystrophy, myotonica (HCC)     FOLLOWS WITH CARDIOLOGY [place] [facility] SEE NOTES CARE EVERYWHERE    Environmental allergies     Hemiatrophy of left leg     Ineffective airway clearance     Left leg weakness     Mood swings        FAMILY HISTORY  History reviewed. No pertinent family history.    SOCIAL HISTORY  Social History     Socioeconomic History    Marital status: Single     Spouse name: None    Number of children: None    Years of education: None    Highest education level: None   Tobacco Use    Smoking status: Never

## 2025-04-16 ENCOUNTER — CLINICAL SUPPORT (OUTPATIENT)
Dept: FAMILY MEDICINE CLINIC | Age: 25
End: 2025-04-16

## 2025-04-16 DIAGNOSIS — Z11.1 VISIT FOR MANTOUX TEST: Primary | ICD-10-CM

## 2025-04-16 LAB
INDURATION: 0
TB SKIN TEST: NEGATIVE

## 2025-04-26 ASSESSMENT — ENCOUNTER SYMPTOMS
RHINORRHEA: 0
PHOTOPHOBIA: 0
EYE REDNESS: 0
EYE DISCHARGE: 0
COUGH: 0
SINUS PAIN: 0
SHORTNESS OF BREATH: 0

## 2025-05-13 NOTE — TELEPHONE ENCOUNTER
Name of Medication(s) Requested:  Requested Prescriptions     Pending Prescriptions Disp Refills    Acetaminophen Childrens 160 MG/5ML SOLN [Pharmacy Med Name: Children's Acetaminophen 160 mg/5 mL oral liquid] 240 mL 1     Sig: TAKE 15.62ml BY MOUTH EVERY 6 HOURS AS NEEDED FOR FEVER       Medication is on current medication list Yes    Dosage and directions were verified? Yes    Quantity verified: 30 day supply     Pharmacy Verified?  Yes    Last Appointment:  4/14/2025    Future appts:  Future Appointments   Date Time Provider Department Center   10/15/2025  1:20 PM Gideon Yadav DO [facility] DE[facility]        (If no appt send self scheduling link. .REFILLAPPT)  Scheduling request sent?     [] Yes  [x] No    Does patient need updated?  [] Yes  [x] No

## 2025-05-15 RX ORDER — ACETAMINOPHEN 160 MG/5ML
LIQUID ORAL
Qty: 240 ML | Refills: 1 | Status: SHIPPED | OUTPATIENT
Start: 2025-05-15

## 2025-05-22 NOTE — TELEPHONE ENCOUNTER
Name of Medication(s) Requested:  Requested Prescriptions     Pending Prescriptions Disp Refills    chlorhexidine (PERIDEX) 0.12 % solution [Pharmacy Med Name: chlorhexidine gluconate 0.12 % mouthwash] 473 mL 1     Sig: SWISH and spit out 15 MILLILITER(S) BY MOUTH TWICE DAILY       Medication is on current medication list Yes    Dosage and directions were verified? Yes    Quantity verified: 30 day supply     Pharmacy Verified?  Yes    Last Appointment:  4/14/2025    Future appts:  Future Appointments   Date Time Provider Department Center   10/15/2025  1:20 PM Gideon Yadav DO [place] DEP        (If no appt send self scheduling link. .REFILLAPPT)  Scheduling request sent?     [] Yes  [] No    Does patient need updated?  [] Yes  [] No

## 2025-05-25 RX ORDER — CHLORHEXIDINE GLUCONATE ORAL RINSE 1.2 MG/ML
SOLUTION DENTAL
Qty: 473 ML | Refills: 1 | Status: SHIPPED | OUTPATIENT
Start: 2025-05-25

## 2025-06-05 NOTE — TELEPHONE ENCOUNTER
melatonin 3 mg tablet   Sig: TAKE ONE TABLET BY MOUTH AT BEDTIME   Dispensed: 2025 12:00 AM   Written: 2/3/2025   Unit strength: 3 mg   Unit form: Tablet Therapy Pack   Days supply: 30   Quantity: 30 each   Refills remainin   Pharmacy: [address] [phone] -  [phone]  [facility] [address]  Phone: [phone]  Fax: [phone]     Name of Medication(s) Requested:  Requested Prescriptions     Pending Prescriptions Disp Refills    melatonin 3 MG TABS tablet [Pharmacy Med Name: melatonin 3 mg tablet] 31 tablet 3     Sig: TAKE ONE TABLET BY MOUTH AT BEDTIME       Medication is on current medication list Yes    Dosage and directions were verified? Yes    Quantity verified: 30 day supply     Pharmacy Verified?  Yes    Last Appointment:  2025    Future appts:  Future Appointments   Date Time Provider Department Center   10/15/2025  1:20 PM Gideon Yadav DO [place] DEP        (If no appt send self scheduling link. .REFILLAPPT)  Scheduling request sent?     [] Yes  [] No    Does patient need updated?  [] Yes  [] No

## 2025-06-12 ENCOUNTER — TELEPHONE (OUTPATIENT)
Dept: FAMILY MEDICINE CLINIC | Age: 25
End: 2025-06-12

## 2025-06-12 NOTE — TELEPHONE ENCOUNTER
The pts Caretaker, Chioma called today, during Covid the pt was given a \"cough assist machine\"  the pt has never used it, and they need a script to discontinue the use of the machine, call Chioma to  script

## 2025-07-21 NOTE — TELEPHONE ENCOUNTER
Pharmacy called stated that the pt is going in to a group home and needed these two request sent for the patient to continue. The correct pharmacy has been pended.

## 2025-07-24 RX ORDER — CHLORHEXIDINE GLUCONATE ORAL RINSE 1.2 MG/ML
SOLUTION DENTAL
Qty: 946 EACH | Refills: 9 | Status: SHIPPED | OUTPATIENT
Start: 2025-07-24

## (undated) DEVICE — GOWN,SIRUS,FABRNF,L,20/CS: Brand: MEDLINE

## (undated) DEVICE — SPONGE LAP W3/8XL1.5IN COT CYL CNTR STRUNG N RADPQ STRL

## (undated) DEVICE — MARKER,SKIN,WI/RULER AND LABELS: Brand: MEDLINE

## (undated) DEVICE — GARMENT,MEDLINE,DVT,INT,CALF,MED, GEN2: Brand: MEDLINE

## (undated) DEVICE — GLOVE SURG SZ 65 THK91MIL LTX FREE SYN POLYISOPRENE

## (undated) DEVICE — DENTAL: Brand: MEDLINE INDUSTRIES, INC.

## (undated) DEVICE — SET PROPHY